# Patient Record
Sex: MALE | Race: WHITE | NOT HISPANIC OR LATINO | Employment: UNEMPLOYED | URBAN - METROPOLITAN AREA
[De-identification: names, ages, dates, MRNs, and addresses within clinical notes are randomized per-mention and may not be internally consistent; named-entity substitution may affect disease eponyms.]

---

## 2019-08-26 ENCOUNTER — OFFICE VISIT (OUTPATIENT)
Dept: CARDIOLOGY | Facility: CLINIC | Age: 54
End: 2019-08-26
Payer: MEDICAID

## 2019-08-26 VITALS
SYSTOLIC BLOOD PRESSURE: 118 MMHG | HEIGHT: 63 IN | HEART RATE: 76 BPM | DIASTOLIC BLOOD PRESSURE: 78 MMHG | BODY MASS INDEX: 17.4 KG/M2 | OXYGEN SATURATION: 98 % | WEIGHT: 98.19 LBS

## 2019-08-26 DIAGNOSIS — R07.89 OTHER CHEST PAIN: Primary | ICD-10-CM

## 2019-08-26 DIAGNOSIS — F10.10 ALCOHOL ABUSE: ICD-10-CM

## 2019-08-26 DIAGNOSIS — F17.200 SMOKING: ICD-10-CM

## 2019-08-26 PROBLEM — R07.9 CHEST PAIN: Status: ACTIVE | Noted: 2019-08-26

## 2019-08-26 PROCEDURE — 99999 PR PBB SHADOW E&M-NEW PATIENT-LVL III: CPT | Mod: PBBFAC,,, | Performed by: INTERNAL MEDICINE

## 2019-08-26 PROCEDURE — 99203 OFFICE O/P NEW LOW 30 MIN: CPT | Mod: PBBFAC,PN | Performed by: INTERNAL MEDICINE

## 2019-08-26 PROCEDURE — 99205 PR OFFICE/OUTPT VISIT, NEW, LEVL V, 60-74 MIN: ICD-10-PCS | Mod: S$PBB,,, | Performed by: INTERNAL MEDICINE

## 2019-08-26 PROCEDURE — 99999 PR PBB SHADOW E&M-NEW PATIENT-LVL III: ICD-10-PCS | Mod: PBBFAC,,, | Performed by: INTERNAL MEDICINE

## 2019-08-26 PROCEDURE — 99205 OFFICE O/P NEW HI 60 MIN: CPT | Mod: S$PBB,,, | Performed by: INTERNAL MEDICINE

## 2019-08-26 NOTE — PATIENT INSTRUCTIONS
Assessment/Plan:  Ronaldo Bautista is a 54 y.o. male with a past medical history of asthma, COPD, smoking, alcohol abuse, who presents for follow up after ED discharge for chest pain.    1. Chest Pain- Pt risk factors for CAD.  Pt states he can't safely walk on a treadmill due to unsteady gait and numbness in right leg.  Check echo and lesixiscan stress test to evaluate further.     2. Smoking- Pt wants to try to stop on his own.  Continue to encourage smoking cessation.     3. Alcohol Abuse- Pt extensively counseled on reducing alcohol intake.      Follow up in  2 weeks

## 2019-08-26 NOTE — LETTER
August 26, 2019      Jenniffer Smith MD  [address] [address]           [facility]  [address] , [facility] [[email or web address]] [id number]  [facility] [address]  Phone: [phone]  Fax: [phone]          Patient: Ronaldo Bautista   MR Number: [id number]   YOB: 1965   Date of Visit: 8/26/2019       Dear Dr. Jenniffer Smith:    Thank you for referring Ronaldo Bautista to me for evaluation. Attached you will find relevant portions of my assessment and plan of care.    If you have questions, please do not hesitate to call me. I look forward to following Ronaldo Bautista along with you.    Sincerely,    Joni Orosco MD PhD    Enclosure  CC:  No Recipients    If you would like to receive this communication electronically, please contact [email or web address] or [phone] to request more information on [employer] Link access.    For providers and/or their staff who would like to refer a patient to [facility], please contact us through our one-stop-shop provider referral line, [facility] [place], at [phone].    If you feel you have received this communication in error or would no longer like to receive these types of communications, please e-mail [email or web address]

## 2019-08-26 NOTE — PROGRESS NOTES
"[facility]       Chief Complaint   Patient presents with    Hospital Follow Up     Angina       Patient ID: Ronaldo Bautista is a 54 y.o. male with a past medical history of asthma, COPD, smoking, alcohol abuse, who presents for follow up after ED discharge for chest pain.  Pertinent history/events are as follows:     -On 8/6/2019, pt was evaluated in the ED at [facility] for complaints of chest pain (details as per ED Physician's note below):  This is an emergent evaluation of a 54-year-old white male past medical history of asthma likely COPD presenting with ongoing chest pain and shortness of breath. Physical exam remarkable for nontoxic afebrile man with expiratory wheezes in all lung fields.  Status post duo nebs x2 patient noted to have improvement of wheezing and moving more air.  Labs do not show leukocytosis, anemia, or electrolyte abnormalities.  Chest x-ray with perivascular infiltrates.  Patient referred to Cardiology and Primary Care and prescribed albuterol, azithromycin, and prednisone.  Troponin negative.  EKG with normal sinus rhythm with no ischemic ST/T wave changes.     HPI:  Mr. Bautista reports episodes of chest pain, usually at rest while watching TV or lying in bed.  Chest pain "sharp", located at left and right breast areas, non-radiating, 8/10 in intensity, last 2 minutes.  No associated n/v/d.  Reports SOB at baseline, which is chronic and unchanged.  Drinks six 16 oz beers daily, has 1 ppd smoking hx for the past 40 years.  No family history of CAD/MI or sudden cardiac death.      Past Medical History:   Diagnosis Date    Asthma      Past Surgical History:   Procedure Laterality Date    HERNIA REPAIR       Social History     Socioeconomic History    Marital status: Single     Spouse name: Not on file    Number of children: Not on file    Years of education: Not on file    Highest education level: Not on file   Occupational History    Not on file "   Social Needs    Financial resource strain: Not on file    Food insecurity:     Worry: Not on file     Inability: Not on file    Transportation needs:     Medical: Not on file     Non-medical: Not on file   Tobacco Use    Smoking status: Current Every Day Smoker     Packs/day: 1.00     Types: Cigarettes, Cigars    Smokeless tobacco: Never Used   Substance and Sexual Activity    Alcohol use: Yes     Comment: 6pk/day    Drug use: Yes     Types: Marijuana    Sexual activity: Not on file   Lifestyle    Physical activity:     Days per week: Not on file     Minutes per session: Not on file    Stress: Not on file   Relationships    Social connections:     Talks on phone: Not on file     Gets together: Not on file     Attends Christian service: Not on file     Active member of club or organization: Not on file     Attends meetings of clubs or organizations: Not on file     Relationship status: Not on file   Other Topics Concern    Not on file   Social History Narrative    Not on file     No family history on file.    Review of patient's allergies indicates:   Allergen Reactions    Ibuprofen Other (See Comments)     Causes shaking/tremors    Codeine Itching       Medication List with Changes/Refills   Current Medications    ALBUTEROL (PROVENTIL/VENTOLIN HFA) 90 MCG/ACTUATION INHALER    Inhale 1-2 puffs into the lungs every 6 (six) hours as needed for Wheezing. Rescue   Discontinued Medications    AZITHROMYCIN (ZITHROMAX Z-RAMONA) 250 MG TABLET    Take 2 tablets (500 mg total) by mouth once daily. Then take one tablet by mouth daily       Review of Systems  Constitution: Denies chills, fever, and sweats.  HENT: Denies headaches or blurry vision.  Cardiovascular: Denies chest pain or irregular heart beat.  Respiratory: Denies cough or shortness of breath.  Gastrointestinal: Denies abdominal pain, nausea, or vomiting.  Musculoskeletal: Denies muscle cramps.  Neurological: Denies dizziness or focal  "weakness.  Psychiatric/Behavioral: Normal mental status.  Hematologic/Lymphatic: Denies bleeding problem or easy bruising/bleeding.  Skin: Denies rash or suspicious lesions    Physical Examination  /78 (BP Location: Right arm, Patient Position: Sitting, BP Method: Small (Manual))   Pulse 76   Ht 5' 3" (1.6 m)   Wt 44.5 kg (98 lb 3.2 oz)   SpO2 98%   BMI 17.40 kg/m²     Constitutional: No acute distress, conversant  HEENT: Sclera anicteric, Pupils equal, round and reactive to light, extraocular motions intact, Oropharynx clear  Neck: No JVD, no carotid bruits  Cardiovascular: regular rate and rhythm, no murmur, rubs or gallops, normal S1/S2  Pulmonary: Clear to auscultation bilaterally  Abdominal: Abdomen soft, nontender, nondistended, positive bowel sounds  Extremities: No lower extremity edema,   Pulses:  Carotid pulses are 2+ on the right side, and 2+ on the left side.  Radial pulses are 2+ on the right side, and 2+ on the left side.   Femoral pulses are 2+ on the right side, and 2+ on the left side.  Popliteal pulses are 2+ on the right side, and 2+ on the left side.   Dorsalis pedis pulses are 2+ on the right side, and 2+ on the left side.   Posterior tibial pulses are 2+ on the right side, and 2+ on the left side.    Skin: No ecchymosis, erythema, or ulcers  Psych: Alert and oriented x 3, appropriate affect  Neuro: CNII-XII intact, no focal deficits    Labs:  Most Recent Data  CBC:   Lab Results   Component Value Date    WBC 6.91 08/06/2019    HGB 12.9 (L) 08/06/2019    HCT 35.4 (L) 08/06/2019    PLT 87 (L) 08/06/2019    MCV 98 08/06/2019    RDW 19.0 (H) 08/06/2019     BMP:   Lab Results   Component Value Date     08/06/2019    K 4.4 08/06/2019     08/06/2019    CO2 23 08/06/2019    BUN 8 08/06/2019    CREATININE 0.72 08/06/2019    GLU 86 08/06/2019    CALCIUM 8.3 (L) 08/06/2019     LFTS;   Lab Results   Component Value Date    PROT 7.4 08/06/2019    ALBUMIN 3.8 08/06/2019    BILITOT 0.5 " 08/06/2019     (H) 08/06/2019    ALKPHOS 157 (H) 08/06/2019    ALT 33 08/06/2019     COAGS: No results found for: INR, PROTIME, PTT  FLP: No results found for: CHOL, HDL, LDLCALC, TRIG, CHOLHDL  CARDIAC:   Lab Results   Component Value Date    TROPONINI <0.012 08/06/2019       EKG8/6/2018:  normal sinus rhythm with no ischemic ST/T wave changes.       Assessment/Plan:  Ronaldo Bautista is a 54 y.o. male with a past medical history of asthma, COPD, smoking, alcohol abuse, who presents for follow up after ED discharge for chest pain.    1. Chest Pain- Pt risk factors for CAD.  Pt states he can't safely walk on a treadmill due to unsteady gait and numbness in right leg.  Check echo and lesixiscan stress test to evaluate further.     2. Smoking- Pt wants to try to stop on his own.  Continue to encourage smoking cessation.     3. Alcohol Abuse- Pt extensively counseled on reducing alcohol intake.      Follow up in  2 weeks    Total duration of face to face visit time 30 minutes.  Total time spent counseling greater than fifty percent of total visit time.  Counseling included discussion regarding imaging findings, diagnosis, possibilities, treatment options, risks and benefits.  The patient had many questions regarding the options and long-term effects.    Joni Orosco MD, PhD  Interventional Cardiology

## 2019-10-09 ENCOUNTER — TELEPHONE (OUTPATIENT)
Dept: CARDIOLOGY | Facility: CLINIC | Age: 54
End: 2019-10-09

## 2019-10-09 NOTE — TELEPHONE ENCOUNTER
----- Message from Deneen Leger sent at 10/9/2019  4:17 PM CDT -----      ----- Message -----  From: Shantell Palomo  Sent: 10/9/2019   4:04 PM CDT  To: [facility] Staff    TREVA, pt  no show for his testing. I try to call pt to r/s but unable to left message due to call can not be completed at this time. Also a letter was mailed to patient to call. He does has an appt with Dr. Orosco on 10/15/19 for results.